# Patient Record
Sex: MALE | Race: WHITE | NOT HISPANIC OR LATINO | URBAN - METROPOLITAN AREA
[De-identification: names, ages, dates, MRNs, and addresses within clinical notes are randomized per-mention and may not be internally consistent; named-entity substitution may affect disease eponyms.]

---

## 2019-06-26 NOTE — PROGRESS NOTES
"  SUBJECTIVE:   Pierce Esquivel is a 23 year old male who presents to clinic today to establish care and to discuss the following problem(s).    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 irritability which triggered this visit  - found that he's not enjoying \"any of his hobbies\" anymore recently, lack of interest to do anythign  - feels that mood and anxiety symptoms \"feed into each other\"  - tends to avoid social situations  - has tried therapy in the past, \"not the most talkative person about my feelings\"    Previous Diagnoses:  \"Depression and anxiety\"    Current Psychiatric Medications:  Lexapro, doesn't know dose, at that time was generally resistant to the idea of treatment, would not consistently for more than a month  Thinks this was also the medication used in college  Doesn't remember any bad side effects    Previous Medications:  On medication in high school and again in college  Did not \"participate super well\" in treatment previously    Manic Symptoms:   Screening for Manic Episode:  Period of abnormally and persistently elevated, expansive, or irritable mood and abnormally increased activity or energy for at least one week: no    Depressive Symptoms:  Over the last 2 weeks, how often have you been bothered by any of the followin = Not at all  1 = Several Days  2= More than half the days  3= Nearly every day    Little interest or pleasure in doing things: 3  Feeling down, depressed, or hopeless: 1  Trouble falling or staying asleep, or sleeping too much: 3  Feeling tired or having little energy: 2  Poor appetite or overeatin  Feeling bad about yourself or that you are a failure or have let yourself or your family down: 1  Trouble concentrating on things, such as reading the newspaper or watching television: 2  Moving or speaking so slowly " "that other people could have noticed or being so figety or restless that you have been moving around a lot more than usual: 0  Thoughts that you would be better off dead or of hurting yourself: 0  Total score: 12    Anxiety Symptoms:  Each item scored 0-3 (0=\"not at all,\" 1=\"several days,\" 2=\"more than half the days,\" 3=\"nearly every day)  Over the last 2 weeks how often have you been bothered by the following problems?  1) Feeling nervous, anxious, or on edge? 2  2) Not being able to stop or control worrying? 2  3) Worrying too much about different things? 2  4) Trouble relaxing? 1  5) Being so restless that it is hard to sit still? 0  6) Becoming easily annoyed or irritable? 3  7) Feeling afraid as if something awful might happen? 0  Total Score: 10    - Intrusive thoughts, images, urges: no  - Repetitive behaviors or mental rituals: no    - Are anxiety symptoms predominantly about one or more social situations in which the individual is exposed to possible scrutiny by others. Examples include social interactions (eg, having a conversation, meeting unfamiliar people), being observed (eg, eating or drinking), and performing in front of others (eg, giving a speech): does feel that anxiety is primarily related to social interactions with people that he doesn't know, which he has to do on a regular basis  - for example, sat next to pool for 30 minutes trying to decide whether to go in the pool because there was a lot of people by it  - will sometimes avoid going to the gym on a Friday evening because he has been made fun for going to the gym on a Friday evening    - Is there a marked fear or anxiety about two (or more) of the following five situations:   1. Using public transportation (e.g., automobiles, buses, trains, ships, planes): no  2. Being in open spaces (e.g., parking lots, marketplaces, bridges): no  3. Being in enclosed places (e.g., shops, theaters, cinemas): no  4. Standing in line or being in a crowd: " no  5. Being outside of the home alone: no    - Discrete brief episodes of anxiety concerning for panic attacks: no    History of Trauma:    In your life, have you ever had an experience so horrible, frightening, or upsetting that, in the past month, you:   - Have had nightmares about it or thought about it when you did not want to? no  - Tried hard not to think about it or went out of your way to avoid situations that reminded you of it? no  - Were constantly on guard, watchful, or easily startled? no  - Felt numb or detached from others, activities, or your surroundings? no    Substance Use History:  Remote cocaine use, not in the past year  Alcohol 1-2 times a week, 4-6 drinks at a time    Safety:  - Any thoughts of hurting yourself or others?: no  - History of suicide attempts: no  - History of cutting or self-harm: no  - Access to firearms: no    Psychotic Symptoms  - AH/VH: no  - Paranoia: no    Family History:  Mother and sister with depression    ROS: Denies fevers, chills, chest pain, difficulty breathing, abdominal pain    Past Medical History:   Diagnosis Date     Anxiety and depression      Past Surgical History:   Procedure Laterality Date     NO HISTORY OF SURGERY       Family History   Problem Relation Age of Onset     Thyroid Disease Mother      Depression Mother      Depression Sister      Thyroid Disease Maternal Grandfather      Asthma No family hx of      C.A.D. 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     No current facility-administered medications for this visit.      I have reviewed the patient's past medical, surgical, family, and social history.     OBJECTIVE:   /83 (BP Location: Right arm, Patient Position: Sitting, Cuff Size: Adult Large)   Pulse 72   Temp 98.7  F (37.1  C) (Oral)   Ht 1.895 m (6' 2.61\")   Wt 89.6 kg (197 lb 8 oz)   SpO2 99%   BMI 24.95 kg/m      Constitutional: well-appearing, appears stated age  Eyes: conjunctivae without erythema, sclera anicteric.   Skin: no rashes, lesions, or wounds    Mental Status Exam:  Appearance/Behaviour: appropriate attire, normal eye contact  Abnormal Motor Activity: none  Speech: normal rate, tone, and fluency; not pressured  Affect: appropriately reactive.  Thought Process: linear, goal-directed  Thought Content: no abnormal content  Perceptions: denies AH/VH, does not appear to be responding to internal stimuli  SI/HI: denies current thoughts of self-harm or harming others  Cognition: intact  Insight/Judgment: excellent    ASSESSMENT AND PLAN:     (F41.9,  F32.9) Anxiety and depression  (primary encounter diagnosis)  Comment: Would meet criteria for both ADOLFO and MDD. Not clear to me if one is the cause of the other at this time or if comorbid. Low suspicion for bipolar disorder, OCD, PTSD, or psychotic disorder based on this brief interview. No concern at this time that he is a risk to himself or others.   Discussed treatment options including therapy and ssris. He is not entirely open to the idea of therapy and has been actively resistant in the past to both psychotherapy and pharmacotherapy. He is willing at least to try therapy again and I will ask our  Rosa to provide him with referral options.  Discussed serotonin specific reuptake inhibitor options with him including risks of stomach upset, sexual dysfunction, weight gain, and cardiac conduction abnormalities with " lexapro/celexa. Given that he has tolerated Lexapro in the past and it generally has a favorable side effect profile, will start with low-dose Lexapro. Cautioned about risk of worsening anxiety initially. Provided with prescription for clonazepam to have as needed while we are increasing dose.  Return in 4 weeks or sooner if needed.     I have queried the [place] Prescription Monitoring Program for this patient to determine if they are an appropriate candidate for a controlled substance prescription. There is no evidence of prescription misuse based on this  search.  Plan: escitalopram (LEXAPRO) 5 MG tablet, clonazePAM         (KLONOPIN) 0.5 MG tablet          (Z23) Need for tetanus, diphtheria, and acellular pertussis (Tdap) vaccine  Comment:   Plan: TDAP VACCINE (BOOSTRIX), VACCINE         ADMINISTRATION, INITIAL            Ilya Muñoz   [facility]  06/27/2019, 4:42 PM

## 2019-06-27 ENCOUNTER — OFFICE VISIT (OUTPATIENT)
Dept: FAMILY MEDICINE | Facility: CLINIC | Age: 24
End: 2019-06-27
Payer: COMMERCIAL

## 2019-06-27 VITALS
OXYGEN SATURATION: 99 % | TEMPERATURE: 98.7 F | BODY MASS INDEX: 24.56 KG/M2 | HEART RATE: 72 BPM | DIASTOLIC BLOOD PRESSURE: 83 MMHG | HEIGHT: 75 IN | SYSTOLIC BLOOD PRESSURE: 133 MMHG | WEIGHT: 197.5 LBS

## 2019-06-27 DIAGNOSIS — F41.9 ANXIETY AND DEPRESSION: Primary | ICD-10-CM

## 2019-06-27 DIAGNOSIS — F32.A ANXIETY AND DEPRESSION: Primary | ICD-10-CM

## 2019-06-27 DIAGNOSIS — Z23 NEED FOR TETANUS, DIPHTHERIA, AND ACELLULAR PERTUSSIS (TDAP) VACCINE: ICD-10-CM

## 2019-06-27 RX ORDER — ESCITALOPRAM OXALATE 5 MG/1
5 TABLET ORAL DAILY
Qty: 30 TABLET | Refills: 2 | Status: SHIPPED | OUTPATIENT
Start: 2019-06-27 | End: 2019-07-30

## 2019-06-27 RX ORDER — CLONAZEPAM 0.5 MG/1
0.5 TABLET ORAL 2 TIMES DAILY PRN
Qty: 42 TABLET | Refills: 0 | Status: SHIPPED | OUTPATIENT
Start: 2019-06-27

## 2019-06-27 SDOH — HEALTH STABILITY: MENTAL HEALTH: HOW OFTEN DO YOU HAVE A DRINK CONTAINING ALCOHOL?: 2-4 TIMES A MONTH

## 2019-06-27 SDOH — HEALTH STABILITY: MENTAL HEALTH: HOW OFTEN DO YOU HAVE 6 OR MORE DRINKS ON ONE OCCASION?: MONTHLY

## 2019-06-27 SDOH — HEALTH STABILITY: MENTAL HEALTH: HOW MANY STANDARD DRINKS CONTAINING ALCOHOL DO YOU HAVE ON A TYPICAL DAY?: 5 OR 6

## 2019-06-27 ASSESSMENT — PATIENT HEALTH QUESTIONNAIRE - PHQ9: 5. POOR APPETITE OR OVEREATING: SEVERAL DAYS

## 2019-06-27 ASSESSMENT — MIFFLIN-ST. JEOR: SCORE: 1960.22

## 2019-06-27 ASSESSMENT — ANXIETY QUESTIONNAIRES
3. WORRYING TOO MUCH ABOUT DIFFERENT THINGS: MORE THAN HALF THE DAYS
IF YOU CHECKED OFF ANY PROBLEMS ON THIS QUESTIONNAIRE, HOW DIFFICULT HAVE THESE PROBLEMS MADE IT FOR YOU TO DO YOUR WORK, TAKE CARE OF THINGS AT HOME, OR GET ALONG WITH OTHER PEOPLE: SOMEWHAT DIFFICULT
6. BECOMING EASILY ANNOYED OR IRRITABLE: NEARLY EVERY DAY
1. FEELING NERVOUS, ANXIOUS, OR ON EDGE: MORE THAN HALF THE DAYS
7. FEELING AFRAID AS IF SOMETHING AWFUL MIGHT HAPPEN: NOT AT ALL
5. BEING SO RESTLESS THAT IT IS HARD TO SIT STILL: NOT AT ALL
GAD7 TOTAL SCORE: 10
2. NOT BEING ABLE TO STOP OR CONTROL WORRYING: MORE THAN HALF THE DAYS

## 2019-06-27 NOTE — NURSING NOTE
"23 year old  Chief Complaint   Patient presents with     Physical     with mental health        Blood pressure 133/83, pulse 72, temperature 98.7  F (37.1  C), temperature source Oral, height 1.895 m (6' 2.61\"), weight 89.6 kg (197 lb 8 oz), SpO2 99 %. Body mass index is 24.95 kg/m .  Patient Active Problem List   Diagnosis     Disturbance in sleep behavior     Other acne       Wt Readings from Last 2 Encounters:   06/27/19 89.6 kg (197 lb 8 oz)   03/15/10 74.4 kg (164 lb 2 oz) (82 %)*     * Growth percentiles are based on CDC (Boys, 2-20 Years) data.     BP Readings from Last 3 Encounters:   06/27/19 133/83   03/15/10 105/72 (11 %/ 59 %)*   05/08/09 110/70 (28 %/ 56 %)*     *BP percentiles are based on the August 2017 AAP Clinical Practice Guideline for boys         Current Outpatient Medications   Medication     FEXOFENADINE  MG OR TABS     No current facility-administered medications for this visit.        Social History     Tobacco Use     Smoking status: Never Smoker     Smokeless tobacco: Never Used   Substance Use Topics     Alcohol use: No     Drug use: No       Health Maintenance Due   Topic Date Due     HIV SCREENING  08/05/2008     PREVENTIVE CARE VISIT  03/15/2011     DTAP/TDAP/TD IMMUNIZATION (1 - Tdap) 08/05/2018     PHQ-2  01/01/2019       No results found for: PAP      June 27, 2019 4:13 PM    "

## 2019-06-27 NOTE — PROGRESS NOTES
Screening Questionnaire for Adult Immunization    Are you sick today?   No   Do you have allergies to medications, food, a vaccine component or latex?   No   Have you ever had a serious reaction after receiving a vaccination?   No   Do you have a long-term health problem with heart disease, lung disease, asthma, kidney disease, metabolic disease (e.g. diabetes), anemia, or other blood disorder?   No   Do you have cancer, leukemia, HIV/AIDS, or any other immune system problem?   No   In the past 3 months, have you taken medications that affect  your immune system, such as prednisone, other steroids, or anticancer drugs; drugs for the treatment of rheumatoid arthritis, Crohn s disease, or psoriasis; or have you had radiation treatments?   No   Have you had a seizure, or a brain or other nervous system problem?   No   During the past year, have you received a transfusion of blood or blood     products, or been given immune (gamma) globulin or antiviral drug?   No   For women: Are you pregnant or is there a chance you could become        pregnant during the next month?   No   Have you received any vaccinations in the past 4 weeks?   No     Immunization questionnaire answers were all negative.        Per orders of Dr. Dillan Muñoz, injection of Tdap given by Jose Maria Roche. Patient instructed to remain in clinic for 15 minutes afterwards, and to report any adverse reaction to me immediately.       Screening performed by Jose Maria Roche on 6/27/2019 at 5:00 PM.

## 2019-06-28 ENCOUNTER — PATIENT OUTREACH (OUTPATIENT)
Dept: FAMILY MEDICINE | Facility: CLINIC | Age: 24
End: 2019-06-28

## 2019-06-28 ASSESSMENT — PATIENT HEALTH QUESTIONNAIRE - PHQ9: SUM OF ALL RESPONSES TO PHQ QUESTIONS 1-9: 12

## 2019-06-28 NOTE — PROGRESS NOTES
Received a referral for this patient from Dr. Muñoz re: counseling resources for anxiety.  Attempted to call the patient, it went to voicemail and the voicemail box was full. Will attempt again on Monday.     JM Gomes

## 2019-06-29 ASSESSMENT — ANXIETY QUESTIONNAIRES: GAD7 TOTAL SCORE: 10

## 2019-07-01 ENCOUNTER — PATIENT OUTREACH (OUTPATIENT)
Dept: FAMILY MEDICINE | Facility: CLINIC | Age: 24
End: 2019-07-01

## 2019-07-01 NOTE — PROGRESS NOTES
Attempt #2 to contact pt. No answer, mailbox full. Will attempt again tomorrow.     Rosa Gamble, HERMANSW

## 2019-07-02 ENCOUNTER — PATIENT OUTREACH (OUTPATIENT)
Dept: FAMILY MEDICINE | Facility: CLINIC | Age: 24
End: 2019-07-02

## 2019-07-02 NOTE — LETTER
July 2, 2019    RE: Pierce Esquivel  [address]  [place] [address]      Dear Pierce,    My name is Rosa, and I am the  for [facility].  Dr. Muñoz requested I reach out to you with resources for therapy.  I attempted to reach you multiple times via phone, but your voicemail is full.  Here is a list of resources to try that are listed as in-network for your insurance.  If you need additional resources, please don't hesitate to call the clinic. I am happy to assist you.     Sincerely,        Rosa Gamble, [facility]    Resources:    [facility]  [address]  [place] [address]  [phone]    [facility]  [address]  [place] [address]  [phone]    [facility]  [address]  [place] [address]  [phone]

## 2019-07-02 NOTE — PROGRESS NOTES
Third attempt to contact pt via phone for resources. Mailbox still full.  No mychart or email to send message.  I will mail a list of resources in a letter. No further calls.

## 2019-07-25 NOTE — PROGRESS NOTES
"  SUBJECTIVE:   Pierce Esquivel is a 23 year old male who presents to clinic today for a return visit.    # MDD  # ADOLFO  - first seen for this problem on 6/27/19, started on low-dose Lexapro 5mg with bridging clonazepam 0.5mg  - ran out of lexapro 2 days ago  - does think that Lexapro was causing some drowsiness during the day time  - switched to taking it at bedtime and seemed to help with the drowsiness  - main complaints at last visit had been anhedonia and employees commenting on irritability  - employees haven't commented more on irritability  - has only taken a couple of doses of clonazepam, didn't notice much affect  - has noticed some decreased appetite as well, not severe  - has not set up care with a therapist but is still interested.     Generalized Anxiety Disorder 7-Item Scale  Each item scored 0-3 (0=\"not at all,\" 1=\"several days,\" 2=\"more than half the days,\" 3=\"nearly every day)  Over the last 2 weeks how often have you been bothered by the following problems?  1) Feeling nervous, anxious, or on edge? 1  2) Not being able to stop or control worrying? 0  3) Worrying too much about different things? 1  4) Trouble relaxing? 0  5) Being so restless that it is hard to sit still? 1  6) Becoming easily annoyed or irritable? 1  7) Feeling afraid as if something awful might happen? 0  Total Score: 4    ADOLFO-7 SCORE 6/27/2019   Total Score 10       Patient Health Questionnaire  Each item scored 0-3 (0=\"not at all,\" 1=\"several days,\" 2=\"more than half the days,\" 3=\"nearly every day)  Over the last 2 weeks how often have you been bothered by the following problems?  Little interest or pleasure in doing things: 1  Feeling down, depressed, or hopeless: 1  Trouble falling or staying asleep, or sleeping too much: 2  Feeling tired or having little energy: 1  Poor appetite or overeating: 3  Feeling bad about yourself or that you are a failure or have let yourself or your family down: 0  Trouble concentrating on things, such " as reading the newspaper or watching television: 0  Moving or speaking so slowly that other people could have noticed or being so fidgety or restless that you have been moving around a lot more than usual: 0  Thoughts that you would be better off dead or of hurting yourself: 0  Total Score: 8    PHQ-9 SCORE 6/27/2019   PHQ-9 Total Score 12       ROS: Denies fevers, chills, chest pain, difficulty breathing, abdominal pain    Patient Active Problem List   Diagnosis     Anxiety and depression     Current Outpatient Medications   Medication     clonazePAM (KLONOPIN) 0.5 MG tablet     escitalopram (LEXAPRO) 10 MG tablet     No current facility-administered medications for this visit.        I have reviewed the patient's relevant past medical history.     OBJECTIVE:   /82 (BP Location: Right arm, Patient Position: Sitting, Cuff Size: Adult Regular)   Pulse 81   Resp 16   Wt 87 kg (191 lb 12 oz)   SpO2 99%   BMI 24.22 kg/m      Constitutional: well-appearing, appears stated age  Eyes: conjunctivae without erythema, sclera anicteric.   Skin: no rashes, lesions, or wounds    Mental Status Exam:  Appearance/Behaviour: appropriate attire, normal eye contact  Abnormal Motor Activity: none  Speech: normal rate, tone, and fluency; not pressured  Affect: appropriately reactive.  Thought Process: linear, goal-directed  Thought Content: no abnormal content  Perceptions: denies AH/VH, does not appear to be responding to internal stimuli  SI/HI: denies current thoughts of self-harm or harming others  Cognition: intact  Insight/Judgment: good    ASSESSMENT AND PLAN:     (F41.9,  F32.9) Anxiety and depression  Comment: Symptomatically improved with low-dose Lexapro. Increase to 10mg at bedtime. Will reach out to our behavioral health counselor to set up a visit for short-term therapy. Follow up in 2-3 months, sooner if needed based on symptoms or side effects from lexapro.   Plan: escitalopram (LEXAPRO) 10 MG tablet             Ilya Muñoz   [facility]  07/30/2019, 5:11 PM

## 2019-07-30 ENCOUNTER — OFFICE VISIT (OUTPATIENT)
Dept: FAMILY MEDICINE | Facility: CLINIC | Age: 24
End: 2019-07-30
Payer: COMMERCIAL

## 2019-07-30 VITALS
HEART RATE: 81 BPM | DIASTOLIC BLOOD PRESSURE: 82 MMHG | BODY MASS INDEX: 24.22 KG/M2 | SYSTOLIC BLOOD PRESSURE: 136 MMHG | RESPIRATION RATE: 16 BRPM | OXYGEN SATURATION: 99 % | WEIGHT: 191.75 LBS

## 2019-07-30 DIAGNOSIS — F41.9 ANXIETY AND DEPRESSION: ICD-10-CM

## 2019-07-30 DIAGNOSIS — F32.A ANXIETY AND DEPRESSION: ICD-10-CM

## 2019-07-30 RX ORDER — ESCITALOPRAM OXALATE 10 MG/1
10 TABLET ORAL DAILY
Qty: 90 TABLET | Refills: 1 | Status: SHIPPED | OUTPATIENT
Start: 2019-07-30 | End: 2020-02-13

## 2019-07-30 ASSESSMENT — ANXIETY QUESTIONNAIRES
1. FEELING NERVOUS, ANXIOUS, OR ON EDGE: SEVERAL DAYS
3. WORRYING TOO MUCH ABOUT DIFFERENT THINGS: SEVERAL DAYS
2. NOT BEING ABLE TO STOP OR CONTROL WORRYING: NOT AT ALL
5. BEING SO RESTLESS THAT IT IS HARD TO SIT STILL: SEVERAL DAYS
GAD7 TOTAL SCORE: 4
IF YOU CHECKED OFF ANY PROBLEMS ON THIS QUESTIONNAIRE, HOW DIFFICULT HAVE THESE PROBLEMS MADE IT FOR YOU TO DO YOUR WORK, TAKE CARE OF THINGS AT HOME, OR GET ALONG WITH OTHER PEOPLE: SOMEWHAT DIFFICULT
6. BECOMING EASILY ANNOYED OR IRRITABLE: SEVERAL DAYS
7. FEELING AFRAID AS IF SOMETHING AWFUL MIGHT HAPPEN: NOT AT ALL

## 2019-07-30 ASSESSMENT — PATIENT HEALTH QUESTIONNAIRE - PHQ9
SUM OF ALL RESPONSES TO PHQ QUESTIONS 1-9: 8
5. POOR APPETITE OR OVEREATING: NOT AT ALL

## 2019-07-30 NOTE — NURSING NOTE
23 year old  Chief Complaint   Patient presents with     Recheck Medication     was making him tired so switched to PM dose reather than AM, is also having some loss of appitite       Blood pressure 136/82, pulse 81, resp. rate 16, weight 87 kg (191 lb 12 oz), SpO2 99 %. Body mass index is 24.22 kg/m .  Patient Active Problem List   Diagnosis     Anxiety and depression       Wt Readings from Last 2 Encounters:   07/30/19 87 kg (191 lb 12 oz)   06/27/19 89.6 kg (197 lb 8 oz)     BP Readings from Last 3 Encounters:   07/30/19 136/82   06/27/19 133/83   03/15/10 105/72 (11 %/ 59 %)*     *BP percentiles are based on the August 2017 AAP Clinical Practice Guideline for boys         Current Outpatient Medications   Medication     clonazePAM (KLONOPIN) 0.5 MG tablet     escitalopram (LEXAPRO) 5 MG tablet     No current facility-administered medications for this visit.        Social History     Tobacco Use     Smoking status: Never Smoker     Smokeless tobacco: Never Used   Substance Use Topics     Alcohol use: Yes     Frequency: 2-4 times a month     Drinks per session: 5 or 6     Binge frequency: Monthly     Drug use: No       Health Maintenance Due   Topic Date Due     DEPRESSION ACTION PLAN  1993     HIV SCREENING  08/05/2008     PREVENTIVE CARE VISIT  03/15/2011       No results found for: PAP      July 30, 2019 4:22 PM

## 2019-07-31 ASSESSMENT — ANXIETY QUESTIONNAIRES: GAD7 TOTAL SCORE: 4

## 2019-11-09 ENCOUNTER — HEALTH MAINTENANCE LETTER (OUTPATIENT)
Age: 24
End: 2019-11-09

## 2020-02-11 DIAGNOSIS — F41.9 ANXIETY AND DEPRESSION: ICD-10-CM

## 2020-02-11 DIAGNOSIS — F32.A ANXIETY AND DEPRESSION: ICD-10-CM

## 2020-02-13 RX ORDER — ESCITALOPRAM OXALATE 10 MG/1
TABLET ORAL
Qty: 30 TABLET | Refills: 0 | Status: SHIPPED | OUTPATIENT
Start: 2020-02-13 | End: 2020-03-20

## 2020-02-13 NOTE — TELEPHONE ENCOUNTER
Last Office Visit: 7/3/19 for anxiety and depression, was to follow up in 2-3 months  Future [facility] Appointments: none  Medication last refilled: 7/30/19 #90 + 1 refill    Called patient but did not reach him and not able to leave voicemail due to mailbox full. Sent patient a [employer] message to schedule appointment.     Medication is being filled for 1 time refill only due to:  due for f/u for anxiety & depression, PHQ9 and GAD7    Meghan Salazar RN  02/13/20  11:59 AM

## 2020-03-20 DIAGNOSIS — F32.A ANXIETY AND DEPRESSION: ICD-10-CM

## 2020-03-20 DIAGNOSIS — F41.9 ANXIETY AND DEPRESSION: ICD-10-CM

## 2020-03-20 RX ORDER — ESCITALOPRAM OXALATE 10 MG/1
TABLET ORAL
Qty: 45 TABLET | Refills: 0 | Status: SHIPPED | OUTPATIENT
Start: 2020-03-20 | End: 2020-05-04

## 2020-03-20 NOTE — TELEPHONE ENCOUNTER
Has appointment with me on 4/24. May need to be changed to telemedicine visit.    Refilled for 45 days to get him through this time.     Ilya Muñoz MD on 3/20/2020 at 4:11 PM

## 2020-05-04 DIAGNOSIS — F41.9 ANXIETY AND DEPRESSION: ICD-10-CM

## 2020-05-04 DIAGNOSIS — F32.A ANXIETY AND DEPRESSION: ICD-10-CM

## 2020-05-04 RX ORDER — ESCITALOPRAM OXALATE 10 MG/1
TABLET ORAL
Qty: 30 TABLET | Refills: 0 | Status: SHIPPED | OUTPATIENT
Start: 2020-05-04

## 2020-05-04 NOTE — TELEPHONE ENCOUNTER
Last Office Visit: 7/30/19   Future [facility] Appointments: none  Medication last refilled: 3/20/20 #45    Medication is being filled for 1 time refill only due to:  Patient needs to be seen because due for follow up on anxiety and depression.     Meghan Salazar RN  05/04/20  11:07 AM

## 2020-06-08 DIAGNOSIS — F41.9 ANXIETY AND DEPRESSION: ICD-10-CM

## 2020-06-08 DIAGNOSIS — F32.A ANXIETY AND DEPRESSION: ICD-10-CM

## 2020-06-08 NOTE — TELEPHONE ENCOUNTER
Last Office Visit: 7/30/19 for anxiety/depression and was to follow up in 2-3 months of that visit.  Future [facility] Appointments: NONE  Medication last refilled: 5/4/20 #30    Routing refill request to provider for review/approval because:  Eliza given x1 and patient did not follow up, please advise    Meghan Salazar RN  06/08/20  2:06 PM

## 2020-06-10 RX ORDER — ESCITALOPRAM OXALATE 10 MG/1
TABLET ORAL
Qty: 30 TABLET | Refills: 0 | OUTPATIENT
Start: 2020-06-10

## 2020-06-10 NOTE — TELEPHONE ENCOUNTER
Message sent to patient's mychart to see if he is even still taking the lexapro.    Ilya Muñoz MD on 6/10/2020 at 11:02 AM

## 2020-12-06 ENCOUNTER — HEALTH MAINTENANCE LETTER (OUTPATIENT)
Age: 25
End: 2020-12-06

## 2021-09-26 ENCOUNTER — HEALTH MAINTENANCE LETTER (OUTPATIENT)
Age: 26
End: 2021-09-26

## 2022-01-15 ENCOUNTER — HEALTH MAINTENANCE LETTER (OUTPATIENT)
Age: 27
End: 2022-01-15

## 2023-04-23 ENCOUNTER — HEALTH MAINTENANCE LETTER (OUTPATIENT)
Age: 28
End: 2023-04-23

## 2024-10-14 SDOH — HEALTH STABILITY: PHYSICAL HEALTH: ON AVERAGE, HOW MANY DAYS PER WEEK DO YOU ENGAGE IN MODERATE TO STRENUOUS EXERCISE (LIKE A BRISK WALK)?: 6 DAYS

## 2024-10-14 SDOH — HEALTH STABILITY: PHYSICAL HEALTH: ON AVERAGE, HOW MANY MINUTES DO YOU ENGAGE IN EXERCISE AT THIS LEVEL?: 60 MIN

## 2024-10-14 ASSESSMENT — SOCIAL DETERMINANTS OF HEALTH (SDOH): HOW OFTEN DO YOU GET TOGETHER WITH FRIENDS OR RELATIVES?: ONCE A WEEK

## 2024-10-14 ASSESSMENT — PATIENT HEALTH QUESTIONNAIRE - PHQ9
SUM OF ALL RESPONSES TO PHQ QUESTIONS 1-9: 14
10. IF YOU CHECKED OFF ANY PROBLEMS, HOW DIFFICULT HAVE THESE PROBLEMS MADE IT FOR YOU TO DO YOUR WORK, TAKE CARE OF THINGS AT HOME, OR GET ALONG WITH OTHER PEOPLE: VERY DIFFICULT
SUM OF ALL RESPONSES TO PHQ QUESTIONS 1-9: 14

## 2024-10-15 ENCOUNTER — OFFICE VISIT (OUTPATIENT)
Dept: FAMILY MEDICINE | Facility: CLINIC | Age: 29
End: 2024-10-15
Payer: COMMERCIAL

## 2024-10-15 VITALS
DIASTOLIC BLOOD PRESSURE: 62 MMHG | OXYGEN SATURATION: 99 % | HEART RATE: 75 BPM | WEIGHT: 202 LBS | RESPIRATION RATE: 16 BRPM | HEIGHT: 75 IN | TEMPERATURE: 97.9 F | SYSTOLIC BLOOD PRESSURE: 133 MMHG | BODY MASS INDEX: 25.12 KG/M2

## 2024-10-15 DIAGNOSIS — Z00.00 ANNUAL PHYSICAL EXAM: Primary | ICD-10-CM

## 2024-10-15 DIAGNOSIS — Z13.228 SCREENING FOR METABOLIC DISORDER: ICD-10-CM

## 2024-10-15 DIAGNOSIS — Z23 HIGH PRIORITY FOR 2019-NCOV VACCINE: ICD-10-CM

## 2024-10-15 DIAGNOSIS — D22.9 CHANGE IN MOLE: ICD-10-CM

## 2024-10-15 DIAGNOSIS — Z13.220 SCREENING FOR LIPID DISORDERS: ICD-10-CM

## 2024-10-15 DIAGNOSIS — R46.89 LOW MOTIVATION: ICD-10-CM

## 2024-10-15 DIAGNOSIS — F32.A ANXIETY AND DEPRESSION: Chronic | ICD-10-CM

## 2024-10-15 DIAGNOSIS — F41.9 ANXIETY AND DEPRESSION: Chronic | ICD-10-CM

## 2024-10-15 DIAGNOSIS — R79.89 LOW SERUM VITAMIN D: ICD-10-CM

## 2024-10-15 DIAGNOSIS — Z23 NEED FOR PROPHYLACTIC VACCINATION AND INOCULATION AGAINST INFLUENZA: ICD-10-CM

## 2024-10-15 LAB
ANION GAP SERPL CALCULATED.3IONS-SCNC: 11 MMOL/L (ref 7–15)
BUN SERPL-MCNC: 20.1 MG/DL (ref 6–20)
CALCIUM SERPL-MCNC: 9.7 MG/DL (ref 8.8–10.4)
CHLORIDE SERPL-SCNC: 102 MMOL/L (ref 98–107)
CHOLEST SERPL-MCNC: 180 MG/DL
CREAT SERPL-MCNC: 1.22 MG/DL (ref 0.67–1.17)
EGFRCR SERPLBLD CKD-EPI 2021: 81 ML/MIN/1.73M2
FASTING STATUS PATIENT QL REPORTED: NO
FASTING STATUS PATIENT QL REPORTED: NO
GLUCOSE SERPL-MCNC: 96 MG/DL (ref 70–99)
HCO3 SERPL-SCNC: 26 MMOL/L (ref 22–29)
HDLC SERPL-MCNC: 57 MG/DL
HGB BLD-MCNC: 15.7 G/DL (ref 13.3–17.7)
LDLC SERPL CALC-MCNC: 115 MG/DL
NONHDLC SERPL-MCNC: 123 MG/DL
POTASSIUM SERPL-SCNC: 3.9 MMOL/L (ref 3.4–5.3)
SODIUM SERPL-SCNC: 139 MMOL/L (ref 135–145)
T4 FREE SERPL-MCNC: 1.53 NG/DL (ref 0.9–1.7)
TRIGL SERPL-MCNC: 40 MG/DL
TSH SERPL DL<=0.005 MIU/L-ACNC: 5.33 UIU/ML (ref 0.3–4.2)
VIT D+METAB SERPL-MCNC: 28 NG/ML (ref 20–50)

## 2024-10-15 PROCEDURE — 84443 ASSAY THYROID STIM HORMONE: CPT | Performed by: FAMILY MEDICINE

## 2024-10-15 PROCEDURE — 82306 VITAMIN D 25 HYDROXY: CPT | Performed by: FAMILY MEDICINE

## 2024-10-15 PROCEDURE — 80061 LIPID PANEL: CPT | Performed by: FAMILY MEDICINE

## 2024-10-15 PROCEDURE — 84439 ASSAY OF FREE THYROXINE: CPT | Performed by: FAMILY MEDICINE

## 2024-10-15 PROCEDURE — 80048 BASIC METABOLIC PNL TOTAL CA: CPT | Performed by: FAMILY MEDICINE

## 2024-10-15 RX ORDER — CITALOPRAM HYDROBROMIDE 10 MG/1
10 TABLET ORAL DAILY
Qty: 30 TABLET | Refills: 0 | Status: SHIPPED | OUTPATIENT
Start: 2024-10-15 | End: 2024-10-28

## 2024-10-15 NOTE — PATIENT INSTRUCTIONS
ASSESSMENT/PLAN:    Annual Exam/Preventive Issues   - Labs: Check Lipids, BMP and TSH. Also, Vitamin D as low in the past  - Immunizations: Give Covid and Flu      -Specific concerns:     Depression and Anxiety   -Referred for counseling. If trouble getting an appointment, then please call our clinic to schedule with Shawn Ullrich, LICSW  - Also, discussed restarting an selective serotonin reuptake inhibitor. He'd been on Lexapro in the past and wasn't helped. Will try Celexa and start at low dose of 10 mg/day. Follow up in 1-2 weeks to discuss increasing. Warned of side effects.  -Recommended less alcohol      2. Concern for ADHD  -Referred for Testing.   Here are some locations to call:   [facility]  [address]  [address]  [place] [address]  Phone: [phone]    =======================================================    [address]  [address]  [place] [address] [phone]    [facility] offers ADHD testing evaluation for only 18+. It is 2-5 appointments    =======================================================    [facility]  [email or web address]  Children and adults    [place]: [phone]  [place]: [phone]  [place]: [phone]  [place]: [phone]  [facility]: [phone]  [facility]: [phone]    =======================================================    [facility]  Various locations  [email or web address]  ADHD evaluations for ages 6+  General Number for multiple locations: [phone]    =======================================================    [facility]  [email or web address]    [address] [place] [id number]  Tel: [phone]    3. Change in mole on RIGHT hand  - Referred to Dermatology      -Follow up: Follow up in about 1-2 weeks    [place]  MD Graciela  Family Medicine and Sports Medicine

## 2024-10-15 NOTE — NURSING NOTE
"Pierce  29 year old    Chief Complaint   Patient presents with    [facility]    Physical    Imm/Inj     Flu Shot    Imm/Inj     COVID-19 VACCINE            Blood pressure 133/62, pulse 75, temperature 97.9  F (36.6  C), temperature source Skin, resp. rate 16, height 1.896 m (6' 2.65\"), weight 91.6 kg (202 lb), SpO2 99%. Body mass index is 25.49 kg/m .    Patient Active Problem List   Diagnosis    Anxiety and depression              Wt Readings from Last 2 Encounters:   10/15/24 91.6 kg (202 lb)   07/30/19 87 kg (191 lb 12 oz)       BP Readings from Last 3 Encounters:   10/15/24 133/62   07/30/19 136/82   06/27/19 133/83                Current Outpatient Medications   Medication Sig Dispense Refill    clonazePAM (KLONOPIN) 0.5 MG tablet Take 1 tablet (0.5 mg) by mouth 2 times daily as needed for anxiety (Patient not taking: Reported on 10/15/2024) 42 tablet 0    escitalopram (LEXAPRO) 10 MG tablet TAKE 1 TABLET(10 MG) BY MOUTH DAILY (Patient not taking: Reported on 10/15/2024) 30 tablet 0     No current facility-administered medications for this visit.              Social History     Tobacco Use    Smoking status: Never    Smokeless tobacco: Never   Substance Use Topics    Alcohol use: Yes     Alcohol/week: 6.0 standard drinks of alcohol     Types: 6 Standard drinks or equivalent per week     Comment: Weekends only    Drug use: Yes     Types: Marijuana     Comment: \"Monthly at most\"              Health Maintenance Due   Topic Date Due    ADVANCE CARE PLANNING  Never done    HIV SCREENING  Never done    YEARLY PREVENTIVE VISIT  03/15/2011    HEPATITIS C SCREENING  Never done    INFLUENZA VACCINE (1) 09/01/2024    COVID-19 Vaccine (3 - 2024-25 season) 09/01/2024            No results found for: \"PAP\"           October 15, 2024 11:16 AM    "

## 2024-10-15 NOTE — PROGRESS NOTES
Preventive Care Visit  [facility]        ASSESSMENT/PLAN:    Annual Exam/Preventive Issues   - Labs: Check Lipids, BMP and TSH. Also, Vitamin D as low in the past  - Immunizations: Give Covid and Flu      -Specific concerns:     Depression and Anxiety   -Referred for counseling. If trouble getting an appointment, then please call our clinic to schedule with Shawn Ullrich, LICSW  - Also, discussed restarting an selective serotonin reuptake inhibitor. He'd been on Lexapro in the past and wasn't helped. Will try Celexa and start at low dose of 10 mg/day. Follow up in 1-2 weeks to discuss increasing. Warned of side effects.  -Recommended less alcohol      2. Concern for ADHD  -Referred for Testing.   Here are some locations to call:   [facility]  [address],  [address]  [place] [address]  Phone: [phone]    =======================================================    [address]  [address]  [place] [address] [phone]    [facility] offers ADHD testing evaluation for only 18+. It is 2-5 appointments    =======================================================    [facility]  [email or web address]  Children and adults    [place]: [phone]  [place]: [phone]  [place]: [phone]  [place]: [phone]  [facility]: [phone]  [facility]: [phone]    =======================================================    [facility]  Various locations  [email or web address]  ADHD evaluations for ages 6+  General Number for multiple locations: [phone]    =======================================================    [facility]  [email or web address]    [address] [place] [address] [address]  [place] [address]  Tel: [phone]          3. Change in mole on RIGHT hand  - Referred to  Dermatology      -Follow up: Follow up in about 1-2 weeks    Valdez Owens MD  Family Medicine and Sports Medicine      INTRODUCTION:   Pierce is here for an annual preventive exam. This is my first time meeting him.  He previously saw Dr. Muñoz at our clinic at his last visit with Dr. Muñoz was in 2019, prior to the pandemic.        While he is here for an annual exam, the largest reason for him scheduling the visit was increasing depression and anxiety.  He has had issues with this since his teenage years.  He reports that he is feeling increasingly tense.  He has some social anxiety which leads to him staying home for days at a time.  Also has a lack of motivation.  He is not getting enjoyment from his hobbies.  While he enjoys playing golf he has only played a few times over the past few months.  He took Lexapro in the past but did not notice any benefit from it.  He was only on a low-dose.  He is not in counseling at the current time.  He is still in close contact with his sister.  He has a full-time job at [employer] and he has been promoted there.      Also, RIGHT hand 6 mm circular mole    Patient Active Problem List   Diagnosis    Anxiety and depression       Social History     Social History Narrative    From [place], [place]. Works for [employer].  of [employer].     Lives alone. Dates women but none now.     Enjoys: Resistance training, Golf, and strategy games.              10/14/2024   General Health   How would you rate your overall physical health? Good   Feel stress (tense, anxious, or unable to sleep) Rather much      (!) STRESS CONCERN      10/14/2024   Nutrition   Three or more servings of calcium each day? Yes   Diet: Regular (no restrictions)   How many servings of fruit and vegetables per day? (!) 2-3   How many sweetened beverages each day? 0-1            10/14/2024   Exercise   Days per week of moderate/strenous exercise 6 days   Average minutes spent exercising at  this level 60 min            10/14/2024   Social Factors   Frequency of gathering with friends or relatives Once a week   Worry food won't last until get money to buy more No    No   Food not last or not have enough money for food? No    No   Do you have housing? (Housing is defined as stable permanent housing and does not include staying ouside in a car, in a tent, in an abandoned building, in an overnight shelter, or couch-surfing.) Yes    Yes   Are you worried about losing your housing? No    No   Lack of transportation? No    No   Unable to get utilities (heat,electricity)? No    No       Multiple values from one day are sorted in reverse-chronological order         10/14/2024   Dental   Dentist two times every year? Yes            10/14/2024   TB Screening   Were you born outside of the [place]? No          Today's PHQ-9 Score:       10/14/2024    10:05 PM   PHQ-9 SCORE   PHQ-9 Total Score MyChart 14 (Moderate depression)   PHQ-9 Total Score 14         10/14/2024   Substance Use   Alcohol more than 3/day or more than 7/wk Yes   How often do you have a drink containing alcohol 2 to 3 times a week   How many alcohol drinks on typical day 5 or 6   How often do you have 5+ drinks at one occasion Weekly   Audit 2/3 Score 5   How often not able to stop drinking once started Monthly   How often failed to do what normally expected Never   How often needed first drink in am after a heavy drinking session Never   How often feeling of guilt or remorse after drinking Monthly   How often unable to remember what happened the night before Less than monthly   Have you or someone else been injured because of your drinking No   Has anyone been concerned or suggested you cut down on drinking No   TOTAL SCORE - AUDIT 13   Do you use any other substances recreationally? (!) CANNABIS PRODUCTS    (!) COCAINE    Stopped the cocaine over the past few months.   Uses cannabis about 1 time/month   Multiple values from one day are sorted in  "reverse-chronological order     Social History     Tobacco Use    Smoking status: Never    Smokeless tobacco: Never   Substance Use Topics    Alcohol use: Yes     Alcohol/week: 6.0 standard drinks of alcohol     Types: 6 Standard drinks or equivalent per week     Comment: Weekends only    Drug use: Yes     Types: Marijuana     Comment: \"Monthly at most\"             10/14/2024   One time HIV Screening   Previous HIV test? No          10/14/2024   STI Screening   New sexual partner(s) since last STI/HIV test? (!) YES, but not concerned about STDs.             10/14/2024   Contraception/Family Planning   Questions about contraception or family planning No         Objective    Exam  /62 (BP Location: Left arm, Patient Position: Sitting, Cuff Size: Adult Large)   Pulse 75   Temp 97.9  F (36.6  C) (Skin)   Resp 16   Ht 1.896 m (6' 2.65\")   Wt 91.6 kg (202 lb)   SpO2 99%   BMI 25.49 kg/m     Estimated body mass index is 25.49 kg/m  as calculated from the following:    Height as of this encounter: 1.896 m (6' 2.65\").    Weight as of this encounter: 91.6 kg (202 lb).    Physical Exam  GENERAL: alert and no distress  EYES: Eyes grossly normal to inspection,  HENT: ear canals and TM's normal, nose and mouth without ulcers or lesions  NECK: no adenopathy, no asymmetry, masses, or scars  RESP: lungs clear to auscultation - no rales, rhonchi or wheezes  CV: regular rate and rhythm, normal S1 S2, no S3 or S4, no murmur, click or rub, no peripheral edema  ABDOMEN: soft, nontender, no hepatosplenomegaly, no masses and bowel sounds normal  MS: no gross musculoskeletal defects noted, no edema  SKIN: RIGHT hand with 6mm in diameter circular brown mole overlying 5th Metacarpal region.  NEURO: Normal strength and tone, mentation intact and speech normal  PSYCH: mentation appears normal, affect slightly flat        Signed Electronically by: Valdez Owens MD    "

## 2024-10-28 ENCOUNTER — VIRTUAL VISIT (OUTPATIENT)
Dept: FAMILY MEDICINE | Facility: CLINIC | Age: 29
End: 2024-10-28
Payer: COMMERCIAL

## 2024-10-28 DIAGNOSIS — F32.A ANXIETY AND DEPRESSION: Chronic | ICD-10-CM

## 2024-10-28 DIAGNOSIS — F41.9 ANXIETY AND DEPRESSION: Chronic | ICD-10-CM

## 2024-10-28 RX ORDER — CITALOPRAM HYDROBROMIDE 20 MG/1
20 TABLET ORAL DAILY
Qty: 30 TABLET | Refills: 1 | Status: SHIPPED | OUTPATIENT
Start: 2024-11-04

## 2024-10-28 ASSESSMENT — ANXIETY QUESTIONNAIRES
4. TROUBLE RELAXING: MORE THAN HALF THE DAYS
3. WORRYING TOO MUCH ABOUT DIFFERENT THINGS: SEVERAL DAYS
GAD7 TOTAL SCORE: 8
6. BECOMING EASILY ANNOYED OR IRRITABLE: SEVERAL DAYS
7. FEELING AFRAID AS IF SOMETHING AWFUL MIGHT HAPPEN: NOT AT ALL
GAD7 TOTAL SCORE: 8
IF YOU CHECKED OFF ANY PROBLEMS ON THIS QUESTIONNAIRE, HOW DIFFICULT HAVE THESE PROBLEMS MADE IT FOR YOU TO DO YOUR WORK, TAKE CARE OF THINGS AT HOME, OR GET ALONG WITH OTHER PEOPLE: SOMEWHAT DIFFICULT
7. FEELING AFRAID AS IF SOMETHING AWFUL MIGHT HAPPEN: NOT AT ALL
1. FEELING NERVOUS, ANXIOUS, OR ON EDGE: NEARLY EVERY DAY
5. BEING SO RESTLESS THAT IT IS HARD TO SIT STILL: NOT AT ALL
GAD7 TOTAL SCORE: 8
8. IF YOU CHECKED OFF ANY PROBLEMS, HOW DIFFICULT HAVE THESE MADE IT FOR YOU TO DO YOUR WORK, TAKE CARE OF THINGS AT HOME, OR GET ALONG WITH OTHER PEOPLE?: SOMEWHAT DIFFICULT
2. NOT BEING ABLE TO STOP OR CONTROL WORRYING: SEVERAL DAYS

## 2024-10-28 ASSESSMENT — PATIENT HEALTH QUESTIONNAIRE - PHQ9
SUM OF ALL RESPONSES TO PHQ QUESTIONS 1-9: 10
10. IF YOU CHECKED OFF ANY PROBLEMS, HOW DIFFICULT HAVE THESE PROBLEMS MADE IT FOR YOU TO DO YOUR WORK, TAKE CARE OF THINGS AT HOME, OR GET ALONG WITH OTHER PEOPLE: VERY DIFFICULT
SUM OF ALL RESPONSES TO PHQ QUESTIONS 1-9: 10

## 2024-10-28 NOTE — PROGRESS NOTES
Pierce is a 29 year old who is being evaluated via a billable video visit.    How would you like to obtain your AVS? MyChart  If the video visit is dropped, the invitation should be resent by: Text to cell phone: [phone]  Will anyone else be joining your video visit? No        Subjective   Pierce is a 29 year old, presenting for the following health issues: Depression.       2 weeks ago  at his annual exam, Jonathan was started on Celexa at 10 mg/day.  He felt a bit off for a few days but that may also have been confounded by the fact that he received a COVID-vaccine that day.  He has started to feel slightly better.  He is feeling more energized during the day and now able to fall asleep at around midnight whereas he used to stay up for several hours past then.  He has had no side effects or setbacks.  He is interested in continuing the medication and also open to increasing the dosage.    He has not yet made an appointment with counseling.  He received a call from them but it went to his voicemail as he was at work at the time.    Objective           Vitals:  No vitals were obtained today due to virtual visit.    Physical Exam   GENERAL: alert and no distress  EYES: Eyes grossly normal to inspection.   RESP: No audible wheeze, cough, or visible cyanosis.    SKIN: Visible skin clear. No significant rash, abnormal pigmentation or lesions.  NEURO: Cranial nerves grossly intact.  Mentation and speech appropriate for age.  PSYCH: Appropriate affect, tone, and pace of words              10/28/2024    12:38 PM   PHQ   PHQ-9 Total Score 10    Q9: Thoughts of better off dead/self-harm past 2 weeks Not at all        Patient-reported         6/27/2019     4:11 PM 7/30/2019     5:24 PM 10/28/2024    12:39 PM   ADOLFO-7 SCORE   Total Score   8 (mild anxiety)   Total Score 10 4 8        Patient-reported       Assessment:   Depression and anxiety.      Plan:  -  Increase Celexa dosage to 20 mg/day.  A new prescription was sent in  which will start in a week from now.  Until then he can  take 2 of his current 10 mg tablets.   -  also, encouraged to schedule a visit with a therapist.  -   Follow-up in 1 to 2 months from now unless there are any setbacks of which he should follow-up sooner.    Video-Visit Details    Type of service:  Video Visit   Originating Location (pt. Location): Home    Distant Location (provider location):  On-site  Platform used for Video Visit: Norma  Signed Electronically by: Valdez Owens MD

## 2025-01-01 DIAGNOSIS — F41.9 ANXIETY AND DEPRESSION: Chronic | ICD-10-CM

## 2025-01-01 DIAGNOSIS — F32.A ANXIETY AND DEPRESSION: Chronic | ICD-10-CM

## 2025-01-02 RX ORDER — CITALOPRAM HYDROBROMIDE 20 MG/1
20 TABLET ORAL DAILY
Qty: 90 TABLET | Refills: 1 | Status: SHIPPED | OUTPATIENT
Start: 2025-01-02

## 2025-01-02 NOTE — TELEPHONE ENCOUNTER
Medication requested: citalopram (CELEXA) 20 MG tablet   Last office visit: 10/15/24  [facility] appointments: none  Medication last refilled: 11/4/24; 30 + 1 refill  Last qualifying labs:    10/28/2024  12:39 PM   PHQ-9 / ADOLFO-7 Scores 8/2015 to present    ADOLFO-7 Score DocFlow 8 ([place])       10/28/2024  12:38 PM   PHQ-9 / ADOLFO-7 Scores 8/2015 to present    PHQ-9 Score DocFlow 10 ([place])     Prescription approved per [facility] Refill Protocol.    Claudy CATES, RN  01/02/25 10:11 AM